# Patient Record
Sex: MALE | Race: WHITE | NOT HISPANIC OR LATINO
[De-identification: names, ages, dates, MRNs, and addresses within clinical notes are randomized per-mention and may not be internally consistent; named-entity substitution may affect disease eponyms.]

---

## 2019-12-20 PROBLEM — Z00.00 ENCOUNTER FOR PREVENTIVE HEALTH EXAMINATION: Status: ACTIVE | Noted: 2019-12-20

## 2020-01-18 ENCOUNTER — TRANSCRIPTION ENCOUNTER (OUTPATIENT)
Age: 45
End: 2020-01-18

## 2020-01-23 ENCOUNTER — APPOINTMENT (OUTPATIENT)
Dept: GASTROENTEROLOGY | Facility: CLINIC | Age: 45
End: 2020-01-23
Payer: COMMERCIAL

## 2020-01-23 VITALS
DIASTOLIC BLOOD PRESSURE: 80 MMHG | HEART RATE: 96 BPM | BODY MASS INDEX: 25.06 KG/M2 | SYSTOLIC BLOOD PRESSURE: 124 MMHG | HEIGHT: 72 IN | WEIGHT: 185 LBS

## 2020-01-23 DIAGNOSIS — K20.9 ESOPHAGITIS, UNSPECIFIED: ICD-10-CM

## 2020-01-23 PROCEDURE — 99243 OFF/OP CNSLTJ NEW/EST LOW 30: CPT

## 2020-01-23 NOTE — ASSESSMENT
[FreeTextEntry1] : Given his symptom improvement, I do think it is reasonable to hold off on a repeat EGD at this time.  However, if his symptoms recur at all, would have a low threshold for a repeat EGD given his recent TNE findings.\par \par Will not restart any acid lowering therapy at this time.\par \par Will continue with his dietary changes.\par \par Will be following with Dr. Cash within the next three months.\par \par Thank you for referring Mr. GARCIA.  Please do not hesitate to call to further discuss his/her care.\par \par Note faxed to requesting MD.\par \par

## 2020-01-23 NOTE — PHYSICAL EXAM
[General Appearance - Alert] : alert [General Appearance - In No Acute Distress] : in no acute distress [Sclera] : the sclera and conjunctiva were normal [Outer Ear] : the ears and nose were normal in appearance [Neck Appearance] : the appearance of the neck was normal [Abdomen Soft] : soft [] : no respiratory distress [Apical Impulse] : the apical impulse was normal [Abdomen Tenderness] : non-tender [Abnormal Walk] : normal gait [Skin Color & Pigmentation] : normal skin color and pigmentation [Cranial Nerves] : cranial nerves 2-12 were intact [Oriented To Time, Place, And Person] : oriented to person, place, and time

## 2020-01-23 NOTE — HISTORY OF PRESENT ILLNESS
[FreeTextEntry1] : Mr. Schmid is a pleasant 44M no significant PMHx who is seen at the request of Dr. Cash for evaluation of GERD.\par \par He developed significant ENT symptoms, mainly pain on the R side of his neck, a globus sensation, ear pain approximately 1 year ago.  He also had associated R sided abdominal pain, thinks this was related to taking a PPI however is unsure.  He underwent an extensive workup including MRI brain/sinuses, abd U/S (unremarkable), CT A/P (unremarkable).  (see scanned documents).\par \par EGD/colonoscopy by Dr. Antony Tejeda 5/8/19: esophageal erythema, Two sub-cm colon polyps, hemorrhoids.\par \par He was treated with a PPI for an extended period of time without discernible improvement.\par \par He was recently seen by Dr. Cash and started on amitriptyline, in addition to a plant based diet.  Had a TNE 12/20/19 showing suspected esophagitis.\par \par His symptoms have significantly improved recently, still mild however continuously improving.  Does not want to undergo a repeat EGD at this time given his improvement.\par \par Does not smoke or drink significantly.\par \par No family history of any GI malignancies.

## 2020-12-16 ENCOUNTER — APPOINTMENT (OUTPATIENT)
Dept: HUMAN REPRODUCTION | Facility: CLINIC | Age: 45
End: 2020-12-16

## 2025-08-17 ENCOUNTER — NON-APPOINTMENT (OUTPATIENT)
Age: 50
End: 2025-08-17